# Patient Record
Sex: MALE | Race: OTHER | HISPANIC OR LATINO | URBAN - METROPOLITAN AREA
[De-identification: names, ages, dates, MRNs, and addresses within clinical notes are randomized per-mention and may not be internally consistent; named-entity substitution may affect disease eponyms.]

---
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
## 2021-01-11 PROBLEM — Z00.00 ENCOUNTER FOR PREVENTIVE HEALTH EXAMINATION: Status: ACTIVE | Noted: 2021-01-11

## 2021-01-12 ENCOUNTER — APPOINTMENT (OUTPATIENT)
Dept: SURGERY | Facility: CLINIC | Age: 21
End: 2021-01-12

## 2021-01-14 ENCOUNTER — APPOINTMENT (OUTPATIENT)
Dept: SURGERY | Facility: CLINIC | Age: 21
End: 2021-01-14
Payer: MEDICAID

## 2021-01-14 VITALS
WEIGHT: 150 LBS | BODY MASS INDEX: 22.73 KG/M2 | DIASTOLIC BLOOD PRESSURE: 81 MMHG | HEART RATE: 89 BPM | SYSTOLIC BLOOD PRESSURE: 141 MMHG | HEIGHT: 68 IN

## 2021-01-14 DIAGNOSIS — K60.2 ANAL FISSURE, UNSPECIFIED: ICD-10-CM

## 2021-01-14 DIAGNOSIS — Z82.49 FAMILY HISTORY OF ISCHEMIC HEART DISEASE AND OTHER DISEASES OF THE CIRCULATORY SYSTEM: ICD-10-CM

## 2021-01-14 PROCEDURE — 99204 OFFICE O/P NEW MOD 45 MIN: CPT

## 2021-01-14 PROCEDURE — 99072 ADDL SUPL MATRL&STAF TM PHE: CPT

## 2021-01-14 RX ORDER — LIDOCAINE 5 G/100G
5 OINTMENT TOPICAL
Qty: 60 | Refills: 1 | Status: ACTIVE | COMMUNITY
Start: 2021-01-14 | End: 1900-01-01

## 2021-01-14 RX ORDER — HYDROCORTISONE ACETATE 25 MG/1
25 SUPPOSITORY RECTAL TWICE DAILY
Qty: 30 | Refills: 2 | Status: ACTIVE | COMMUNITY
Start: 2021-01-14 | End: 1900-01-01

## 2021-01-14 NOTE — ASSESSMENT
Discharge Instructions for Pulmonary Embolism  A deep vein thrombosis (DVT) is a blood clot in a large vein deep in a leg, arm, or elsewhere in the body. The clot can separate from the vein, travel to the lungs and cut off blood flow. This is a pulmonary embolism (PE). Pulmonary embolism is very serious and may cause death.   Health care providers use the term venous thromboembolism (VTE) to describe both DVT and PE. They use the term VTE because the 2 conditions are very closely related. And, because their prevention and treatment are closely related.   Home care  Taking care of yourself is very important. To help prevent more blood clots from forming, follow your health care provider's instructions. You should:  · Take your medicines exactly as instructed. Don’t skip doses. If you miss a dose, call your health care provider and ask what you should do.    · Have all lab tests as recommended. This is very important when you take medicines to prevent blood clots.   · If your health care provider has instructed you to do so, wear elastic (compression stockings).  · Get up and get moving.  · While sitting for long periods of time, move your knees, ankles, feet, and toes.  Lifestyle changes  To help prevent problems with your heart and blood vessels, you should do the following:   · If you smoke, think about quitting. Talk with your health care provider about medications and programs that can help.  · Stay at a healthy weight. Talk to your health care provider about losing weight, if you are overweight  · Try to exercise at least 30 minutes on most days. Before starting an exercise program, talk with your health care provider.   · When traveling by car, make frequent stops to get up and move around.  · On long airplane rides, get up and move around when possible. If you can’t get up, wiggle your toes, move your ankles and tighten your calves to keep your blood moving.  Follow-up care  Make a follow-up appointment as  [FreeTextEntry1] : 20y.o. M w/ rectal pain and symptoms consistent with an anal fissure. No external hemorrhoids on exam and no signs of significant internal hemorrhoids. directed  Have your lab work done as directed.     When to seek medical advice  Call your health care provider if you have pain, swelling, and redness in your leg, arm, or other body area. These symptoms may mean another blood clot.  And, call your health care provider if you have signs and symptoms of bleeding, like blood in your urine, bleeding with bowel movements, or bleeding from the nose, gums, a cut, or vagina.   Call 911  Call 911 or get emergency help if you have symptoms of a blood clot in the lungs including:   · Chest pain  · Trouble breathing  · Coughing (may cough up blood)  · Fast heartbeat  · Sweating  · Fainting  Also call 911 if you have:  · Heavy or uncontrolled bleeding. If you are taking a blood thinner, you have an increased chance of bleeding.   © 2461-3865 [employer]. [address] [place] [address], [place] [address]. All rights reserved. This information is not intended as a substitute for professional medical care. Always follow your healthcare professional's instructions.

## 2021-01-14 NOTE — HISTORY OF PRESENT ILLNESS
[FreeTextEntry1] : Mr. Damien Johnson is a 20y.o. M w/ no significant past history presenting with "hemorrhoids"  x1yr. He reports that he developed symptoms shortly after returning from a trip in [place] where he had a bad GI illness (4days of vomiting, diarrhea and abd pain). He says he has perianal discomfort which becomes pain when he walks, runs or does activities. He reports that he has daily BMs but they are hard and occasionally he has to strain with them. His BMs are sometimes painful and feel like he is being stabbed in the rectum. He has tried stool softeners, lactulose and hydrocortisone suppositories (prescribed by his PCP) and the only thing that has helped is the hydrocortisone but he hasn't had complete resolution of his symptoms. He notices occasional blood on the toilet paper when he wipes but otherwise no blood. His doctor told him he has hemorrhoids and referred him for management of them.

## 2021-01-14 NOTE — PHYSICAL EXAM
[Excoriation] : no perianal excoriation [Fistula] : no fistulas [Tender, Swollen] : nontender, non-swollen [Thrombosed] : that was not thrombosed [Skin Tags] : there were no residual hemorrhoidal skin tags seen [Normal] : was normal [Anterior] : anteriorly [Posterior] : posteriorly [Stool Sample Taken] : no stool obtained on rectal exam [Gross Blood] : no gross blood [JVD] : no jugular venous distention  [Normal Breath Sounds] : Normal breath sounds [Wheezing] : no wheezing was heard [Normal Heart Sounds] : normal heart sounds [Normal Rate and Rhythm] : normal rate and rhythm [No Rash or Lesion] : No rash or lesion [Alert] : alert [Oriented to Person] : oriented to person [Oriented to Place] : oriented to place [Oriented to Time] : oriented to time [de-identified] : soft, non-distended, non-tender to palpation [de-identified] : no external hemorrhoids, normal rectal tone, no gross blood, some pain on MAYA [de-identified] : awake, alert, in NAD [de-identified] : normocephalic, atraumatic, EOMI, nl conjunctiva, wearing glasses [de-identified] : b/l chest rise, EWOB on RA [de-identified] : deferred [de-identified] : normal strength [de-identified] : normal mood, abnormal affect

## 2021-01-14 NOTE — REVIEW OF SYSTEMS
[Fever] : no fever [Chills] : no chills [Recent Weight Loss (___ Lbs)] : no recent weight loss [Eye Pain] : no eye pain [Earache] : no earache [Chest Pain] : no chest pain [Palpitations] : no palpitations [Shortness Of Breath] : no shortness of breath [Cough] : no cough [Abdominal Pain] : no abdominal pain [Vomiting] : no vomiting [Dysuria] : no dysuria [Negative] : Integumentary

## 2021-02-16 ENCOUNTER — APPOINTMENT (OUTPATIENT)
Dept: SURGERY | Facility: CLINIC | Age: 21
End: 2021-02-16
Payer: MEDICAID

## 2021-02-16 VITALS
SYSTOLIC BLOOD PRESSURE: 124 MMHG | HEIGHT: 68 IN | BODY MASS INDEX: 23.49 KG/M2 | HEART RATE: 96 BPM | WEIGHT: 155 LBS | DIASTOLIC BLOOD PRESSURE: 71 MMHG

## 2021-02-16 PROCEDURE — 99072 ADDL SUPL MATRL&STAF TM PHE: CPT

## 2021-02-16 PROCEDURE — 99213 OFFICE O/P EST LOW 20 MIN: CPT

## 2021-02-16 NOTE — PHYSICAL EXAM
[JVD] : no jugular venous distention  [Normal Rate and Rhythm] : normal rate and rhythm [Alert] : alert [Oriented to Person] : oriented to person [Oriented to Time] : oriented to time [Oriented to Place] : oriented to place [de-identified] : soft, non-distended [de-identified] : awake, alert, in NAD [de-identified] : normocephalic, atraumatic, EOMI, nl conjunctiva [de-identified] : b/l chest rise, EWOB on RA [de-identified] : deferred [de-identified] : normal strength [de-identified] : normal mood, abnormal affect

## 2021-02-16 NOTE — HISTORY OF PRESENT ILLNESS
[FreeTextEntry1] : Mr. Damien Johnson is a 20y.o. M presenting for follow-up for his fissure. He presented to the office a month ago reporting perianal discomfort (following a trip to [place] where he had a severe GI illness) exacerbated with activity and occasional stabbing pain w/ BMs. He had tried hydrocortisone w/ his PCP with some improvement in his symptoms but not complete resolution. When he was seen here in the office he was given maximal medical therapy w/ nifedipine-lidocaine cream added to hydrocortisone and he was given my anal fissure protocol. He states he has had some improvement but persistent symptoms.

## 2021-02-16 NOTE — ASSESSMENT
[FreeTextEntry1] : 20y.o. M w/ rectal pain and symptoms consistent with an anal fissure who has failed maximal medical therapy.

## 2021-02-25 DIAGNOSIS — Z01.818 ENCOUNTER FOR OTHER PREPROCEDURAL EXAMINATION: ICD-10-CM

## 2021-02-26 ENCOUNTER — OUTPATIENT (OUTPATIENT)
Dept: OUTPATIENT SERVICES | Facility: HOSPITAL | Age: 21
LOS: 1 days | End: 2021-02-26
Payer: MEDICAID

## 2021-02-26 ENCOUNTER — APPOINTMENT (OUTPATIENT)
Dept: DISASTER EMERGENCY | Facility: CLINIC | Age: 21
End: 2021-02-26

## 2021-02-26 VITALS
TEMPERATURE: 98 F | RESPIRATION RATE: 18 BRPM | SYSTOLIC BLOOD PRESSURE: 125 MMHG | HEART RATE: 87 BPM | DIASTOLIC BLOOD PRESSURE: 71 MMHG | OXYGEN SATURATION: 98 % | HEIGHT: 68 IN | WEIGHT: 154.98 LBS

## 2021-02-26 DIAGNOSIS — K60.2 ANAL FISSURE, UNSPECIFIED: ICD-10-CM

## 2021-02-26 DIAGNOSIS — Z01.818 ENCOUNTER FOR OTHER PREPROCEDURAL EXAMINATION: ICD-10-CM

## 2021-02-26 LAB
ALBUMIN SERPL ELPH-MCNC: 4.2 G/DL — SIGNIFICANT CHANGE UP (ref 3.5–5)
ALP SERPL-CCNC: 71 U/L — SIGNIFICANT CHANGE UP (ref 40–120)
ALT FLD-CCNC: 21 U/L DA — SIGNIFICANT CHANGE UP (ref 10–60)
ANION GAP SERPL CALC-SCNC: 4 MMOL/L — LOW (ref 5–17)
AST SERPL-CCNC: 15 U/L — SIGNIFICANT CHANGE UP (ref 10–40)
BILIRUB SERPL-MCNC: 0.6 MG/DL — SIGNIFICANT CHANGE UP (ref 0.2–1.2)
BUN SERPL-MCNC: 13 MG/DL — SIGNIFICANT CHANGE UP (ref 7–18)
CALCIUM SERPL-MCNC: 8.8 MG/DL — SIGNIFICANT CHANGE UP (ref 8.4–10.5)
CHLORIDE SERPL-SCNC: 108 MMOL/L — SIGNIFICANT CHANGE UP (ref 96–108)
CO2 SERPL-SCNC: 27 MMOL/L — SIGNIFICANT CHANGE UP (ref 22–31)
CREAT SERPL-MCNC: 0.92 MG/DL — SIGNIFICANT CHANGE UP (ref 0.5–1.3)
GLUCOSE SERPL-MCNC: 86 MG/DL — SIGNIFICANT CHANGE UP (ref 70–99)
HCT VFR BLD CALC: 45.1 % — SIGNIFICANT CHANGE UP (ref 39–50)
HGB BLD-MCNC: 14.6 G/DL — SIGNIFICANT CHANGE UP (ref 13–17)
MCHC RBC-ENTMCNC: 26.3 PG — LOW (ref 27–34)
MCHC RBC-ENTMCNC: 32.4 GM/DL — SIGNIFICANT CHANGE UP (ref 32–36)
MCV RBC AUTO: 81.1 FL — SIGNIFICANT CHANGE UP (ref 80–100)
NRBC # BLD: 0 /100 WBCS — SIGNIFICANT CHANGE UP (ref 0–0)
PLATELET # BLD AUTO: 271 K/UL — SIGNIFICANT CHANGE UP (ref 150–400)
POTASSIUM SERPL-MCNC: 3.9 MMOL/L — SIGNIFICANT CHANGE UP (ref 3.5–5.3)
POTASSIUM SERPL-SCNC: 3.9 MMOL/L — SIGNIFICANT CHANGE UP (ref 3.5–5.3)
PROT SERPL-MCNC: 7.4 G/DL — SIGNIFICANT CHANGE UP (ref 6–8.3)
RBC # BLD: 5.56 M/UL — SIGNIFICANT CHANGE UP (ref 4.2–5.8)
RBC # FLD: 12.3 % — SIGNIFICANT CHANGE UP (ref 10.3–14.5)
SODIUM SERPL-SCNC: 139 MMOL/L — SIGNIFICANT CHANGE UP (ref 135–145)
WBC # BLD: 5.31 K/UL — SIGNIFICANT CHANGE UP (ref 3.8–10.5)
WBC # FLD AUTO: 5.31 K/UL — SIGNIFICANT CHANGE UP (ref 3.8–10.5)

## 2021-02-26 PROCEDURE — 85027 COMPLETE CBC AUTOMATED: CPT

## 2021-02-26 PROCEDURE — 80053 COMPREHEN METABOLIC PANEL: CPT

## 2021-02-26 PROCEDURE — G0463: CPT

## 2021-02-26 PROCEDURE — 36415 COLL VENOUS BLD VENIPUNCTURE: CPT

## 2021-02-26 NOTE — H&P PST ADULT - ATTENDING COMMENTS
Persistent fissure symptoms despite medical therapy.    OR today for EUA, possible LIS, possible fissurectomy  Prone jackknife  1:1 marcaine0.5% and exparel  MAC for anesthesia    Calderon Donohue MD

## 2021-02-26 NOTE — H&P PST ADULT - NSICDXPROBLEM_GEN_ALL_CORE_FT
PROBLEM DIAGNOSES  Problem: Anal fissure  Assessment and Plan: Exam under anesthesia, lateral internal sphincterotomy, fissurectomy on 03/01/2021. Preoperative instructions discussed with pt and given to pt. Instructed pt that no one will be allowed to come to hospital with him, to notify security when he arrives in the lobby of the hospital that he is here for surgery, that he will need someone to come to the hospital to pick him up after surgery,not to eat or drink anything after midnight the night before the surgery, to avoid NSAIDs such as Ibuprofen, Motrin, Aleve, Advil, naproxen before surgery, to take Tylenol if needed for pain, to report if he has been exposed to anyone with any contagious diseases including Covid-19 or if he is exhibiting any symptoms of COVID-19. Covid test done today as per pt. Instructed about use of Chlorhexidine 4% soap before surgery. Verbalized understanding of instructions given.

## 2021-02-26 NOTE — H&P PST ADULT - HISTORY OF PRESENT ILLNESS
20 yr old male with no PMH presents with c/o perianal pain due to anal fissure for 1 year. Pt for exam under anesthesia, lateral internal sphincterotomy, fissurectomy on 03/01/2021.

## 2021-02-26 NOTE — H&P PST ADULT - ASSESSMENT
20 yr old male with no PMH presents with anal fissure. Pt is scheduled for exam under anesthesia, lateral internal sphincterotomy, fissurectomy on 03/01/2021.

## 2021-02-26 NOTE — H&P PST ADULT - NSICDXFAMILYHX_GEN_ALL_CORE_FT
FAMILY HISTORY:  Family history of breast cancer in mother  Family history of MI (myocardial infarction), father, paternal grandfather

## 2021-02-27 LAB — SARS-COV-2 N GENE NPH QL NAA+PROBE: NOT DETECTED

## 2021-02-28 ENCOUNTER — TRANSCRIPTION ENCOUNTER (OUTPATIENT)
Age: 21
End: 2021-02-28

## 2021-03-01 ENCOUNTER — APPOINTMENT (OUTPATIENT)
Dept: SURGERY | Facility: HOSPITAL | Age: 21
End: 2021-03-01
Payer: MEDICAID

## 2021-03-01 ENCOUNTER — OUTPATIENT (OUTPATIENT)
Dept: OUTPATIENT SERVICES | Facility: HOSPITAL | Age: 21
LOS: 1 days | End: 2021-03-01
Payer: MEDICAID

## 2021-03-01 VITALS
RESPIRATION RATE: 16 BRPM | SYSTOLIC BLOOD PRESSURE: 114 MMHG | TEMPERATURE: 98 F | DIASTOLIC BLOOD PRESSURE: 81 MMHG | OXYGEN SATURATION: 100 % | HEART RATE: 76 BPM

## 2021-03-01 VITALS
DIASTOLIC BLOOD PRESSURE: 67 MMHG | TEMPERATURE: 98 F | HEIGHT: 68 IN | SYSTOLIC BLOOD PRESSURE: 112 MMHG | HEART RATE: 86 BPM | RESPIRATION RATE: 16 BRPM | WEIGHT: 154.98 LBS | OXYGEN SATURATION: 99 %

## 2021-03-01 DIAGNOSIS — K60.2 ANAL FISSURE, UNSPECIFIED: ICD-10-CM

## 2021-03-01 PROCEDURE — 46080 SPHNCTROTMY ANAL DIV SPHNCTR: CPT

## 2021-03-01 PROCEDURE — ZZZZZ: CPT

## 2021-03-01 RX ORDER — POLYETHYLENE GLYCOL 3350 17 G/17G
17 POWDER, FOR SOLUTION ORAL
Qty: 238 | Refills: 0
Start: 2021-03-01 | End: 2021-03-07

## 2021-03-01 RX ORDER — IBUPROFEN 200 MG
1 TABLET ORAL
Qty: 0 | Refills: 0 | DISCHARGE

## 2021-03-01 RX ORDER — DOCUSATE SODIUM 100 MG
1 CAPSULE ORAL
Qty: 63 | Refills: 0
Start: 2021-03-01 | End: 2021-03-21

## 2021-03-01 RX ORDER — LIDOCAINE 4 G/100G
1 CREAM TOPICAL
Qty: 0 | Refills: 0 | DISCHARGE

## 2021-03-01 RX ORDER — ACETAMINOPHEN 500 MG
2 TABLET ORAL
Qty: 0 | Refills: 0 | DISCHARGE

## 2021-03-01 RX ORDER — SODIUM CHLORIDE 9 MG/ML
3 INJECTION INTRAMUSCULAR; INTRAVENOUS; SUBCUTANEOUS EVERY 8 HOURS
Refills: 0 | Status: DISCONTINUED | OUTPATIENT
Start: 2021-03-01 | End: 2021-03-01

## 2021-03-01 NOTE — ASU DISCHARGE PLAN (ADULT/PEDIATRIC) - MODE OF TRANSPORTATION
Impression: Presence of intraocular lens: Z96.1. Plan: Discussed Diagnosis w/ pt. Will continue to monitor with yearly Exams. Wheelchair/Stroller

## 2021-03-01 NOTE — PACU DISCHARGE NOTE - NS MD DISCHARGE NOTE DISCHARGE
Please contact pt and ask if he is requesting refill of methylprednisolone. We do not usually refill this, please schedule appointment if he is having a concern. Jaky RAMIRES     Home

## 2021-03-04 PROBLEM — K60.2 ANAL FISSURE, UNSPECIFIED: Chronic | Status: ACTIVE | Noted: 2021-02-26

## 2021-03-23 ENCOUNTER — APPOINTMENT (OUTPATIENT)
Dept: SURGERY | Facility: CLINIC | Age: 21
End: 2021-03-23
Payer: MEDICAID

## 2021-03-23 VITALS — SYSTOLIC BLOOD PRESSURE: 130 MMHG | DIASTOLIC BLOOD PRESSURE: 70 MMHG | HEART RATE: 80 BPM

## 2021-03-23 PROCEDURE — 99024 POSTOP FOLLOW-UP VISIT: CPT

## 2021-03-24 NOTE — HISTORY OF PRESENT ILLNESS
[FreeTextEntry1] : Mr. Damien Johnson is a 20y.o. M presenting for his scheduled post-op visit. He originally presented to the office in January reporting perianal discomfort (following a trip to [place] where he had a severe GI illness) exacerbated with activity and occasional stabbing pain w/ BMs. He had tried hydrocortisone w/ his PCP with some improvement in his symptoms but not complete resolution. After seeing me I placed him maximal medical therapy w/ nifedipine-lidocaine cream and though he reported some improvement he still had some symptoms. Thus the decision was made to proceed with surgery. He reports that he still has some occasional feeling of irritation/burning but states it isn't pain. He no longer sees the blood but does have some pain w/ some BMs.\par  \par

## 2021-03-24 NOTE — PHYSICAL EXAM
[Skin Tags] : there were no residual hemorrhoidal skin tags seen [JVD] : no jugular venous distention  [Normal Rate and Rhythm] : normal rate and rhythm [Alert] : alert [Oriented to Person] : oriented to person [Oriented to Place] : oriented to place [Oriented to Time] : oriented to time [de-identified] : soft, non-distended [de-identified] : pt very ticklish and sensitive which significantly limits the exam, externally no hemorrhoids, mild skin perineal skin irritation [de-identified] : awake, alert, in NAD [de-identified] : normocephalic, atraumatic, EOMI, nl conjunctiva [de-identified] : b/l chest rise, EWOB on RA [de-identified] : deferred [de-identified] : normal strength [de-identified] : normal mood and affect

## 2021-04-08 NOTE — ED PEDIATRIC NURSE NOTE - DOES PATIENT HAVE ADVANCE DIRECTIVE
Client will return Priyanka 3 at 8am. He will work on mindfully identifying his strengths and things he is proud of, as well as his values, while working to challenge pressures on self and self-criticism to reduce anxiety and distress.      No

## 2021-04-27 ENCOUNTER — APPOINTMENT (OUTPATIENT)
Dept: SURGERY | Facility: CLINIC | Age: 21
End: 2021-04-27
Payer: MEDICAID

## 2021-04-27 VITALS
HEIGHT: 68 IN | HEART RATE: 93 BPM | BODY MASS INDEX: 22.73 KG/M2 | WEIGHT: 150 LBS | SYSTOLIC BLOOD PRESSURE: 137 MMHG | DIASTOLIC BLOOD PRESSURE: 82 MMHG

## 2021-04-27 DIAGNOSIS — K62.89 OTHER SPECIFIED DISEASES OF ANUS AND RECTUM: ICD-10-CM

## 2021-04-27 PROCEDURE — 99212 OFFICE O/P EST SF 10 MIN: CPT

## 2021-04-27 PROCEDURE — 99072 ADDL SUPL MATRL&STAF TM PHE: CPT

## 2021-04-30 NOTE — HISTORY OF PRESENT ILLNESS
[FreeTextEntry1] : Mr. Damien Johnson is a 20y.o. M presenting for follow up. He originally presented to the office in January reporting perianal discomfort (following a trip to [place] where he had a severe GI illness) exacerbated with activity and occasional stabbing pain w/ BMs. He had tried hydrocortisone w/ his PCP with some improvement in his symptoms but not complete resolution. After seeing me I placed him maximal medical therapy w/ nifedipine-lidocaine cream and though he reported some improvement he still had some symptoms. Thus the decision was made to proceed with surgery. On 3/1/21 he was taken to the OR for an EUA and lateral internal sphincterotomy. He reports resolution of his pain and blood from the fissure but is reporting some itching and drainage without incontinence. He states the drainage is a brown and dark colour. Denies any fevers or chills.

## 2021-04-30 NOTE — PHYSICAL EXAM
[JVD] : no jugular venous distention  [Normal Rate and Rhythm] : normal rate and rhythm [No Rash or Lesion] : No rash or lesion [Alert] : alert [Oriented to Person] : oriented to person [Oriented to Place] : oriented to place [Oriented to Time] : oriented to time [de-identified] : soft, non-distended [de-identified] : no external hemorrhoids, well healed sphincterotomy site, no fluctuance or active drainage though scant amount of old stuff noted on gauze, limited exam due to pt's sensitivity to being touched [de-identified] : awake, alert, in NAD [de-identified] : normocephalic, atraumatic, EOMI, nl conjunctiva [de-identified] : b/l chest rise, EWOB on RA [de-identified] : normal strength [de-identified] : deferred [de-identified] : normal mood

## 2021-08-21 NOTE — ASSESSMENT
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
complains of pain/discomfort
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
## 2023-03-15 NOTE — ASU PREOP CHECKLIST - PATIENT'S PERSONAL PROPERTY GIVEN TO
locker 5/on unit Azathioprine Pregnancy And Lactation Text: This medication is Pregnancy Category D and isn't considered safe during pregnancy. It is unknown if this medication is excreted in breast milk.

## 2023-05-10 NOTE — PACU DISCHARGE NOTE - NAUSEA/VOMITING:
[facility] Solid Organ Transplant  Outpatient MNT: Kidney Transplant Evaluation    Current BMI: 40.81 (HT 68.5 in, .4 lbs/123.6 kg)  BMI guideline for kidney transplant up to a BMI of 40 / per surgeon discretion     Frailty Assessment -- Not frail (0/5 points)     Recommended Interventions:  Carlos reports already working on weight loss at this time. Reports previous success with this program.      Time Spent: 30 minutes  Visit Type: Follow up, last assessed 09/13/2021  Referring Physician: Dr. Chauhan  Pt accompanied by: Self     History of previous txp: Yes, Kidney 07/29/2004  Dialysis: No    Protein supplement: No    Nutrition Assessment  Patient reports starting Weight [employer] with his wife. Last time with the WW program, patient reports ~25 lbs of weight loss, and would hope to achieve that again. Utilizes points system, appears to be focusing on low fat protein sources.     - Appetite: Stable/good  - Food allergies/intolerances: No  - Meal prep & grocery shopping: Carlos (self)  - Previous RD education: With last transplant   - Issues chewing or swallowing: No  - N/V/D/C: Not consistently   - Food access concerns: No    Vitamins, Supplements, Pertinent Meds: Warfarin, Lipitor, Vitamin D, Ferrous sulfate, Lasix, One-a-day mends, Protonix, Prednisone  Herbal Medicines/Supplements: No    Edema: Reports yes in legs and feet. More predominant at night.     Weight hx: Patient reports weight at home a few days ago was 267 (without heavy clothes and felt less fluid up compared to today).   Wt Readings from Last Encounters:   05/10/23 123.6 kg (272 lb 6.4 oz)   05/01/23 - .7 kg (264 lb)   04/20/23 - .9 kg (264 lb 6.4 oz)   04/06/23 - .6 kg (274 lb 11.2 oz)   10/10/23 -  kg (269 lb)   06/28/22 - .5 kg (270 lb)   09/13/21 119.1 kg (262 lb 9.1 oz)   09/13/21 119.1 kg (262 lb 9.6 oz)   06/21/21 127 kg (280 lb)   06/08/21 127 kg (280 lb)     Diet Recall  Breakfast Couple eggs, toast,  turkey sausage britta.    Lunch [place] with turkey   Dinner Chicken   Grilled turkey   Veggies for sides    Snacks Light popcorn    Beverages Water   Caffeine free diet coke   Alcohol Socially, weekends    Dining out Occasionally 1-2 x per month     Physical Activity   Swimming and light weights at gym   Light weight lifting when back pain is controlled, 3 days per week.   Golfing in summer, has not started for this year yet.   Household chores    Labs  Reviewed     Nutrition Diagnosis  No nutrition diagnosis identified at this time     Nutrition Intervention  Nutrition education provided:  Discussed sodium intake (low sodium foods and drinks, seasoning food without salt and tips for low sodium diet).    Reviewed post txp diet guidelines in brief (will review in further detail post txp):  (1) Review of proper food safety measures d/t immunosuppressant therapy post-op and increased risk for food-borne illness    (2) Avoid the following post txp d/t risk for rejection, unknown effects on the organs, and/or potential interactions with immunosuppressants:  - Herbal, Chinese, holistic, chiropractic, natural, alternative medicines and supplements  - Detoxes and cleanses  - Weight loss pills  - Protein powders or other products with extracts or herbs (ie green tea extract)    (3) Med regimen and possible side effects    Patient Understanding: Pt verbalized understanding of education provided.  Expected Engagement: Good   Follow-Up Plans: PRN     Nutrition Goals  Continue with current personal weight loss goals.     Milagro Gonzalez RD, LD                                        None

## 2024-04-08 NOTE — ASU PREOP CHECKLIST - BMI (KG/M2)
23.6 Bleeding that does not stop/Swelling that gets worse/Wound/Surgical Site with redness, or foul smelling discharge or pus/Numbness, tingling, color or temperature change to extremity

## 2024-11-01 NOTE — ED PEDIATRIC TRIAGE NOTE - CCCP TRG CHIEF CMPLNT
Arrived to the Infusion Center.  Lupron completed.   Provided education on Lupron    Patient instructed to report any side affects to ordering provider.  Patient tolerated Yes.   Any issues or concerns during appointment: No.    Discharged ambulate @11:25.   
pain / blood in urine/urinary symptoms

## 2025-02-11 NOTE — ED PEDIATRIC NURSE NOTE - NSFALLRSKASSESASSIST_ED_ALL_ED
You can access the FollowMyHealth Patient Portal offered by [facility] by registering at the following website: [email or web address]. By joining [facility]’s FollowMyHealth portal, you will also be able to view your health information using other applications (apps) compatible with our system. no